# Patient Record
Sex: MALE | Race: WHITE
[De-identification: names, ages, dates, MRNs, and addresses within clinical notes are randomized per-mention and may not be internally consistent; named-entity substitution may affect disease eponyms.]

---

## 2021-02-19 ENCOUNTER — HOSPITAL ENCOUNTER (EMERGENCY)
Dept: HOSPITAL 33 - ED | Age: 46
Discharge: HOME | End: 2021-02-19
Payer: SELF-PAY

## 2021-02-19 VITALS — OXYGEN SATURATION: 97 % | HEART RATE: 64 BPM | DIASTOLIC BLOOD PRESSURE: 73 MMHG | SYSTOLIC BLOOD PRESSURE: 129 MMHG
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
## 2021-02-19 DIAGNOSIS — M25.521: Primary | ICD-10-CM

## 2021-02-19 PROCEDURE — 73080 X-RAY EXAM OF ELBOW: CPT

## 2021-02-19 PROCEDURE — 99283 EMERGENCY DEPT VISIT LOW MDM: CPT

## 2021-02-19 NOTE — XRAY
Indication: Pain.  No known injury.



Comparison: None



3 view right elbow demonstrates tiny olecranon process spur.  No other bony,

articular, or soft tissue abnormalities.

## 2021-02-19 NOTE — ERPHSYRPT
- History of Present Illness


Time Seen by Provider: 02/19/21 07:55


Source: patient


Exam Limitations: no limitations


Patient Subjective Stated Complaint: R elbow pain


Triage Nursing Assessment: pt to ED c/o R elbow pain x 1-1.5 weeks. reports he 

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

describes as throbbing pain that is 8/10. pain occasionally shoots through arm 

with certain movements. no swelling or redness noted on assessment. no loss of 

ROM.


Physician History: 





Location: right elbow


Quality: sharp


Radiation: down arm


Severity: moderate


Duration: 1-2 weeks


Timing: gradual 


Modifying factors/associated signs and symptoms: none tried


Allergies/Adverse Reactions: 








No Known Drug Allergies Allergy (Unverified 02/19/21 07:50)


   





Home Medications: 








No Reportable Medications [No Reported Medications]  02/19/21 [History]





Hx Tetanus, Diphtheria Vaccination/Date Given: No


Hx Influenza Vaccination/Date Given: No


Immunizations Up to Date: No





Travel Risk





- International Travel


Have you traveled outside of the country in past 3 weeks: No





- Coronavirus Screening


Are you exhibiting any of the following symptoms?: No


Close contact with a COVID-19 positive Pt in past 14-21 Days: No





- Review of Systems


Constitutional: No Fever, No Chills


Eyes: No Symptoms


Ears, Nose, & Throat: No Symptoms


Respiratory: No Cough, No Dyspnea


Cardiac: No Chest Pain, No Edema, No Syncope


Abdominal/Gastrointestinal: No Abdominal Pain, No Nausea, No Vomiting, No 

Diarrhea


Genitourinary Symptoms: No Dysuria


Musculoskeletal: Joint Pain, Other (right elbow pain), No Back Pain, No Neck 

Pain


Skin: Other, No Rash


Neurological: No Dizziness, No Focal Weakness, No Sensory Changes


Psychological: No Symptoms


Endocrine: No Symptoms


All Other Systems: Reviewed and Negative





- Past Medical History


Pertinent Past Medical History: No





- Past Surgical History


Past Surgical History: Yes


Musculoskeletal: Orthopedic Surgery


Male Surgical History: Vasectomy


Other Surgical History: L shoulder 2006





- Social History


Smoking Status: Light tobacco smoker


Exposure to second hand smoke: Yes


Drug Use: none


Patient Lives Alone: No





- Nursing Vital Signs


Nursing Vital Signs: 


                               Initial Vital Signs











Temperature  96.8 F   02/19/21 07:43


 


Pulse Rate  60   02/19/21 07:43


 


Respiratory Rate  18   02/19/21 07:43


 


Blood Pressure  166/81   02/19/21 07:43


 


O2 Sat by Pulse Oximetry  99   02/19/21 07:43








                                   Pain Scale











Pain Intensity                 8

















- Physical Exam


General Appearance: no apparent distress, alert


Eye Exam: PERRL/EOMI, eyes nml inspection


Ears, Nose, Throat Exam: normal ENT inspection, TMs normal, pharynx normal, 

moist mucous membranes


Neck Exam: normal inspection, non-tender, supple, full range of motion


Respiratory Exam: normal breath sounds, lungs clear, No respiratory distress


Cardiovascular Exam: regular rate/rhythm, normal heart sounds, normal peripheral

 pulses


Gastrointestinal/Abdomen Exam: soft, normal bowel sounds, No tenderness, No mass


Back Exam: normal inspection, normal range of motion, No CVA tenderness, No ve

rtebral tenderness


Extremity Exam: normal inspection, normal range of motion, pelvis stable, other 

(Right elbow pain. No obvious deformity, sensation intact, 2+ capillary refill, 

2 point tactile discrimination intact. 5 out of 5)


Neurologic Exam: alert, oriented x 3, cooperative, normal mood/affect, nml 

cerebellar function, nml station & gait, sensation nml, No motor deficits


Skin Exam: normal color, warm, dry, No rash


Lymphatic Exam: No adenopathy


SpO2: 99





- Course


Nursing assessment & vital signs reviewed: Yes


Ordered Tests: 


                               Active Orders 24 hr











 Category Date Time Status


 


 ELBOW (MINIMUM 3 VIEWS) Stat Exams  02/19/21 08:40 Completed














- Progress


Progress: improved


Progress Note: 





02/19/21 08:24


We will obtain an XR of right elbow. 


02/19/21 09:05


no fracture thing. Patient should follow up with ortho for continued monitoring,

 possible MRI.





Plan of care was discussed with patient and all questions answered. The patient 

is agreeable to be


discharged home and both verbal and printed discharge instructions were 

provided.The patient agreed


to seek outpatient follow up as discussed. The patient was given strict 

instructions to return to the


emergency department for worsening symptoms or any other emergent concerns. The 

patient


verbalized understanding. 


Counseled pt/family regarding: diagnosis, need for follow-up, rad results





- Departure


Departure Disposition: Home


Clinical Impression: 


 Right elbow pain





Condition: Stable


Critical Care Time: No


Instructions:  Elbow Sprain (DC)

## 2021-05-24 ENCOUNTER — HOSPITAL ENCOUNTER (EMERGENCY)
Dept: HOSPITAL 33 - ED | Age: 46
Discharge: HOME | End: 2021-05-24
Payer: COMMERCIAL

## 2021-05-24 VITALS — OXYGEN SATURATION: 100 % | SYSTOLIC BLOOD PRESSURE: 141 MMHG | DIASTOLIC BLOOD PRESSURE: 72 MMHG | HEART RATE: 55 BPM

## 2021-05-24 DIAGNOSIS — S33.9XXA: Primary | ICD-10-CM
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
## 2021-05-24 LAB
GLUCOSE UR-MCNC: NEGATIVE MG/DL
PROT UR STRIP-MCNC: NEGATIVE MG/DL
RBC #/AREA URNS HPF: (no result) /HPF (ref 0–2)
WBC #/AREA URNS HPF: (no result) /HPF (ref 0–5)

## 2021-05-24 PROCEDURE — 99284 EMERGENCY DEPT VISIT MOD MDM: CPT

## 2021-05-24 PROCEDURE — 96374 THER/PROPH/DIAG INJ IV PUSH: CPT

## 2021-05-24 PROCEDURE — 36000 PLACE NEEDLE IN VEIN: CPT

## 2021-05-24 PROCEDURE — 72100 X-RAY EXAM L-S SPINE 2/3 VWS: CPT

## 2021-05-24 PROCEDURE — 96375 TX/PRO/DX INJ NEW DRUG ADDON: CPT

## 2021-05-24 PROCEDURE — 81001 URINALYSIS AUTO W/SCOPE: CPT

## 2021-05-24 NOTE — XRAY
Indication: Low back pain following lifting injury.



Comparison: None



3 view lumbar spine demonstrates 5 lumbar segments in normal alignment with

minimal L5-S1 disc space narrowing and incidental splenic calcified

granulomas.  No other bony, articular, or soft tissue abnormalities.

## 2021-05-24 NOTE — ERPHSYRPT
- History of Present Illness


Time Seen by Provider: 05/24/21 08:50


Source: patient


Exam Limitations: no limitations


Patient Subjective Stated Complaint: Pt was moving furniture and twisted and 

felt something in his lower left back and now he is in pain


Triage Nursing Assessment: Pt drove self to the ER, vitals wnl, rates pain 8/10,

pain to lower left back, denies any other injuries, pulses normal


Physician History: 


Patient is a 45-year-old male presents to our ED for evaluation of pain to his 

left lower back.  Patient states he was moving furniture yesterday.  Patient 

twisted and felt a sudden onset of pain in his left lower back.  No radiation to

his legs.  Pain described as an ache that is well localized.  No associated 

chest pain.  No nausea vomiting or diaphoresis.  Pain worse with movement and 

palpation.  Pain improved with rest.  Patient voices no other complaints or 

concerns at this time.





Timing/Duration: yesterday


Method of Injury: lifting


Quality: dull


Back Pain Location: lumbar spine


Severity of Pain-Max: moderate


Severity of Pain-Current: mild


Modifying Factors: Improves With: movement


Associated Symptoms: denies symptoms, lower back pain, No fever, No sweating, No

urinary incontinence, No loss of bowel control, No nausea, No vomiting, No 

problems urinating, No light-headedness, No dizziness, No numbness in legs/feet,

No weakness, No tingling in legs/feet


Previous symptoms: no prior history


Allergies/Adverse Reactions: 








No Known Drug Allergies Allergy (Verified 05/24/21 08:46)


   





Hx Tetanus, Diphtheria Vaccination/Date Given: No


Hx Influenza Vaccination/Date Given: No





Travel Risk





- International Travel


Have you traveled outside of the country in past 3 weeks: No





- Coronavirus Screening


Are you exhibiting any of the following symptoms?: No


Close contact with a COVID-19 positive Pt in past 14-21 Days: No





- Vaccine Status


Have you recieved a Covid-19 vaccination: No





- Review of Systems


Constitutional: No Symptoms, No Fever, No Chills


Eyes: No Symptoms


Ears, Nose, & Throat: No Symptoms


Respiratory: No Symptoms, No Cough, No Dyspnea


Cardiac: No Symptoms, No Chest Pain, No Edema, No Syncope


Abdominal/Gastrointestinal: No Symptoms, No Abdominal Pain, No Nausea, No 

Vomiting, No Diarrhea


Genitourinary Symptoms: No Symptoms, No Dysuria


Musculoskeletal: No Symptoms, No Back Pain, No Neck Pain


Skin: No Symptoms, No Rash


Neurological: No Symptoms, No Dizziness, No Focal Weakness, No Sensory Changes


Psychological: No Symptoms


Endocrine: No Symptoms


Hematologic/Lymphatic: No Symptoms


Immunological/Allergic: No Symptoms


All Other Systems: Reviewed and Negative





- Past Medical History


Pertinent Past Medical History: No





- Past Surgical History


Past Surgical History: Yes


Musculoskeletal: Orthopedic Surgery


Male Surgical History: Vasectomy


Other Surgical History: L shoulder 2006





- Social History


Smoking Status: Light tobacco smoker


Exposure to second hand smoke: Yes


Drug Use: none


Patient Lives Alone: No





- Nursing Vital Signs


Nursing Vital Signs: 


                               Initial Vital Signs











Temperature  97.4 F   05/24/21 08:34


 


Pulse Rate  55 L  05/24/21 08:34


 


Blood Pressure  141/72   05/24/21 08:34


 


O2 Sat by Pulse Oximetry  100   05/24/21 08:34








                                   Pain Scale











Pain Intensity []              8


 


Pain Intensity                 8

















- Physical Exam


General Appearance: no apparent distress, alert


Eye Exam: PERRL/EOMI, eyes nml inspection


Neck Exam: normal inspection, non-tender, supple, full range of motion, No 

meningismus, No midline tenderness


Respiratory Exam: normal breath sounds, lungs clear, No respiratory distress


Cardiovascular Exam: regular rate/rhythm, normal heart sounds


Gastrointestinal Exam: soft, No tenderness, No mass, No pulsatile mass


Back Exam: normal inspection (guarded ROM.  TTP at left lumbar paraspinal 

musculature.  Overlying ST intact. ), normal range of motion, No vertebral 

tenderness


Extremity Exam: normal inspection, normal range of motion, No calf tenderness, 

No pedal edema


Neurologic Exam: alert, oriented x 3, cooperative, CNs II-XII nml as tested, 

normal mood/affect, nml station & gait, sensation nml, No motor deficits


Skin Exam: normal color, warm, dry, No rash


Lymphatic Exam: No adenopathy


**SpO2 Interpretation**: normal


SpO2: 100


O2 Delivery: Room Air





- Course


Nursing assessment & vital signs reviewed: Yes





- Radiology Exams


  ** L-Spine


X-ray Interpretation: Teleradiologist Report (5 lumbar segments in normal 

alignment with minimal L5-S1 disc space narrowing and incidental splenic 

calcified granulomas.  No other bony articular or soft tissue abnormalities.)


Ordered Tests: 


                               Active Orders 24 hr











 Category Date Time Status


 


 IV Insertion STAT Care  05/24/21 08:41 Active


 


 LUMBAR LIMITED (2 OR 3 VIEWS) Stat Exams  05/24/21 08:45 Completed


 


 UA W/RFX UR CULTURE Stat Lab  05/24/21 09:06 Ordered








Medication Summary














Discontinued Medications














Generic Name Dose Route Start Last Admin





  Trade Name Samantha  PRN Reason Stop Dose Admin


 


Ketorolac Tromethamine  30 mg  05/24/21 08:41  05/24/21 09:09





  Toradol 30 Mg Injection***  IV  05/24/21 08:42  30 mg





  STAT ONE   Administration


 


Ketorolac Tromethamine  Confirm  05/24/21 09:04 





  Toradol 30 Mg Injection***  Administered  05/24/21 09:05 





  Dose  





  30 mg  





  .ROUTE  





  .STK-MED ONE  


 


Methylprednisolone Sodium Succinate  125 mg  05/24/21 08:43  05/24/21 09:09





  Solu-Medrol 125 Mg***  IV  05/24/21 08:44  125 mg





  STAT ONE   Administration


 


Methylprednisolone Sodium Succinate  Confirm  05/24/21 09:04 





  Solu-Medrol 125 Mg***  Administered  05/24/21 09:05 





  Dose  





  125 mg  





  .ROUTE  





  .STK-MED ONE  











Lab/Rad Data: 


                               Laboratory Results











  05/24/21 Range/Units





  09:06 


 


Urine Color  YELLOW  (YELLOW)  


 


Urine Appearance  CLEAR  (CLEAR)  


 


Urine pH  7.0  (5-6)  


 


Ur Specific Gravity  1.019  (1.005-1.025)  


 


Urine Protein  NEGATIVE  (Negative)  


 


Urine Ketones  NEGATIVE  (NEGATIVE)  


 


Urine Blood  NEGATIVE  (0-5)  Leighton/ul


 


Urine Nitrite  NEGATIVE  (NEGATIVE)  


 


Urine Bilirubin  NEGATIVE  (NEGATIVE)  


 


Urine Urobilinogen  NEGATIVE  (0-1)  mg/dL


 


Ur Leukocyte Esterase  NEGATIVE  (NEGATIVE)  


 


Urine WBC (Auto)  NONE  (0-5)  /HPF


 


Urine RBC (Auto)  NONE  (0-2)  /HPF


 


U Epithel Cells (Auto)  NONE  (FEW)  /HPF


 


Urine Bacteria (Auto)  NONE  (NEGATIVE)  /HPF


 


Urine Mucus (Auto)  SLIGHT  (NEGATIVE)  /HPF


 


Urine Culture Reflexed  NO  (NO)  


 


Urine Glucose  NEGATIVE  (NEGATIVE)  mg/dL














- Progress


Progress: improved


Progress Note: 


Patient reassessed.  Pain improved.  Vitals stable.  X-ray negative for fracture

 dislocation.  A prescription for Toradol was forwarded the patient's pharmacy. 

 Patient agrees to follow-up with his primary care doctor within 48 hours for 

reevaluation.


05/24/21 09:19





Portions of this note were created with voice recognition technology.  There may

 be grammatical, spelling, punctuation or sound alike errors


05/24/21 09:20





Counseled pt/family regarding: diagnosis, need for follow-up, rad results





- Departure


Departure Disposition: Home


Clinical Impression: 


 Lumbosacral strain





Condition: Stable


Critical Care Time: No


Referrals: 


DOCTOR,NO FAMILY [Primary Care Provider] - 


DARYL DE LA FUENTE MD [ACTIVE STAFF] - 


Instructions:  Low Back Pain  (DC)


Additional Instructions: 


Discharge/Care Plan





MELY MCKNIGHT was seen on 05/24/21 in the Emergency Room. The patient was 

counseled regarding Diagnosis,Lab results, Imaging studies, need for follow up 

and when to return to the Emergency Room.





Prescriptions given:





Discharge Note





I have spoken with the patient and/or caregivers. I have explained the patient's

condition, diagnosis and treatment plan based on the information available to me

at this time. I have answered the patient's and/or caregiver's questions and 

addressed any concerns. The patient and/or caregivers have as good understanding

of the patient's diagnosis, condition and treatment plan as can be expected at 

this point. The vital signs have been stable. The patient's condition is stable 

and appropriate for discharge from the emergency department.





The patient will pursue further outpatient evaluation with the primary care 

physician or other designated or consulting physician as outlined in the 

discharge instructions. The patient and/or caregivers are agreeable to this plan

of care and follow-up instructions have been explained in detail. The patient 

and/or caregivers have received these instruction. The patient/and or caregivers

are aware that any significant change in condition or worsening of symptoms 

should prompt an immediate return to this or the closest emergency department or

call 911. 








Forms:  Work/School Release Form


Prescriptions: 


Ketorolac Tromethamine [Toradol] 10 mg PO TID 5 Days #15 tablet

## 2021-06-02 ENCOUNTER — HOSPITAL ENCOUNTER (EMERGENCY)
Dept: HOSPITAL 33 - ED | Age: 46
Discharge: HOME | End: 2021-06-02
Payer: COMMERCIAL

## 2021-06-02 VITALS — SYSTOLIC BLOOD PRESSURE: 115 MMHG | DIASTOLIC BLOOD PRESSURE: 78 MMHG | OXYGEN SATURATION: 95 %

## 2021-06-02 VITALS — HEART RATE: 53 BPM

## 2021-06-02 DIAGNOSIS — M51.06: ICD-10-CM

## 2021-06-02 DIAGNOSIS — M54.5: Primary | ICD-10-CM
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
## 2021-06-02 PROCEDURE — 96372 THER/PROPH/DIAG INJ SC/IM: CPT

## 2021-06-02 PROCEDURE — 72131 CT LUMBAR SPINE W/O DYE: CPT

## 2021-06-02 PROCEDURE — 99284 EMERGENCY DEPT VISIT MOD MDM: CPT

## 2021-06-02 NOTE — XRAY
Indication: Low back pain radiating left leg.  Lifting injury one week ago.



Multiple contiguous axial images obtained through the lumbar spine.  Sagittal

and coronal reformatted images obtained.



Comparison: None



T12-L4 disc levels unremarkable.  At the L4-L5 level, there is mild

broad-based left lateral disc bulge producing left foraminal stenosis better

evaluated with MRI.  At the L5-S1 level, there is minimal broad-based central

disc bulge.  Facets are symmetric.  Incidental 5 mm left S2 bone island.



Sagittal and coronal reformatted images demonstrates normal alignment with

disc spaces maintained.  Minimal T12-L1 anterior wedging, probable

transitional segments.  No acute fracture or subluxation.



Visualized noncontrasted soft tissues demonstrates minimal aortoiliac

calcifications.



Impression:

1.  L4-S1 broad-based disc bulge as detailed.  Outpatient MRI may yield

further information.

2.  Incidental tiny left S2 bone island.

## 2021-06-02 NOTE — ERPHSYRPT
- History of Present Illness


Source: patient


Exam Limitations: no limitations


Patient Subjective Stated Complaint: pt states he has been having increased back

pain since last night. states pain is in th emiddle of his lower back and 

radiates to front of lt leg at times. denies any bowel or bladder incontinence.


Triage Nursing Assessment: pt alert and oriented, answers questions approp. pt 

ambulatory with slow limping gait noted. skin warm and dry. respirations 

nonlabored. pedal pulse and cap refill to bilat lower ext wnl. pt denies 

numbness or tingling.


Timing/Duration: week(s) (1), sudden, worse


Method of Injury: lifting, twisted


Quality: sharp, throbbing


Back Pain Location: lumbar spine, paraspinous muscles


Back Pain Radiation: upper legs


Severity of Pain-Max: severe


Severity of Pain-Current: severe


Modifying Factors: Improves With: immobilization, pain medication, rest.  

Worsens With: movement


Associated Symptoms: lower back pain, muscle spasms, No fever, No chills, No 

urinary incontinence, No loss of bowel control, No constipation, No nausea, No 

vomiting, No problems urinating, No numbness in legs/feet, No weakness, No 

sensory/motor loss, No tingling in legs/feet


Previous symptoms: no prior history


Hx Tetanus, Diphtheria Vaccination/Date Given: No


Hx Influenza Vaccination/Date Given: No


Hx Pneumococcal Vaccination/Date Given: No


Immunizations Up to Date: No





<ESPINOZA REGAN - Last Filed: 06/02/21 06:45>





<CHRISTIANO MCWILLIAMS - Last Filed: 06/02/21 09:01>





- History of Present Illness


Time Seen by Provider: 06/02/21 06:34


Physician History: 





45 years old male presented in the ER with chief complaint of low back pain 

which started almost a week ago after lifting heavy object, was evaluated in the

ER with a negative x-rays with off work until yesterday.  Patient report he went

back to work yesterday and was unable to walk at the end of the day with 

worsening overnight and this morning could move himself out of bed with severe 

sharp pain low back midline and to the left with radiation.  Denies any asso

ciated numbness tingling or weakness of left lower extremity.  No loss of bowel 

or bladder control. (ESPINOZA REGAN)


Allergies/Adverse Reactions: 








No Known Drug Allergies Allergy (Verified 06/02/21 06:43)


   








Travel Risk





- International Travel


Have you traveled outside of the country in past 3 weeks: No





- Coronavirus Screening


Are you exhibiting any of the following symptoms?: No


Close contact with a COVID-19 positive Pt in past 14-21 Days: No





- Vaccine Status


Have you recieved a Covid-19 vaccination: No





<ESPINOZA REGAN - Last Filed: 06/02/21 06:45>





- Review of Systems


Constitutional: No Symptoms


Eyes: No Symptoms


Ears, Nose, & Throat: No Symptoms


Respiratory: No Symptoms


Cardiac: No Symptoms


Abdominal/Gastrointestinal: No Symptoms


Genitourinary Symptoms: No Symptoms


Musculoskeletal: Back Pain


Skin: No Symptoms


Neurological: No Symptoms


Psychological: No Symptoms


Endocrine: No Symptoms


Hematologic/Lymphatic: No Symptoms


Immunological/Allergic: No Symptoms





<ESPINOZA REGAN - Last Filed: 06/02/21 06:45>





- Past Medical History


Pertinent Past Medical History: No





- Past Surgical History


Past Surgical History: Yes


Musculoskeletal: Orthopedic Surgery


Male Surgical History: Vasectomy


Other Surgical History: L shoulder 2006





- Social History


Smoking Status: Light tobacco smoker


Exposure to second hand smoke: No


Drug Use: none


Patient Lives Alone: No





<ESPINOZA REGAN - Last Filed: 06/02/21 06:45>





- Physical Exam


General Appearance: no apparent distress, alert


Eye Exam: PERRL/EOMI, eyes nml inspection


Ears, Nose, Throat Exam: normal ENT inspection


Neck Exam: normal inspection, full range of motion


Respiratory Exam: normal breath sounds, lungs clear


Cardiovascular Exam: regular rate/rhythm, normal heart sounds


Gastrointestinal Exam: soft, normal bowel sounds, No tenderness


Back Exam: normal inspection, vertebral tenderness (Lumbar), decreased range of 

motion, muscle spasm, point tenderness, No normal range of motion


Extremity Exam: normal inspection, pelvis stable, limited range of motion (Left 

lower extremity because of pain in the low back)


Neurologic Exam: alert, oriented x 3, cooperative, CNs II-XII nml as tested, 

normal mood/affect, abnormal gait, other (2+ knee reflex and Achilles and left 

lower.  Intact sensation.), No sensory deficit


Skin Exam: normal color


**SpO2 Interpretation**: normal


SpO2: 99


O2 Delivery: Room Air





<ESPINOZA REGAN - Last Filed: 06/02/21 06:45>





- Nursing Vital Signs


Nursing Vital Signs: 


                               Initial Vital Signs











Temperature  98.5 F   06/02/21 06:22


 


Pulse Rate  59 L  06/02/21 06:22


 


Respiratory Rate  18   06/02/21 06:22


 


Blood Pressure  132/83   06/02/21 06:22


 


O2 Sat by Pulse Oximetry  99   06/02/21 06:22








                                   Pain Scale











Pain Intensity                 4














Ordered Tests: 


                               Active Orders 24 hr











 Category Date Time Status


 


 LUMBAR SPINE W/O [CT] Stat Exams  06/02/21 06:44 Completed








Medication Summary














Discontinued Medications














Generic Name Dose Route Start Last Admin





  Trade Name Samantha  PRN Reason Stop Dose Admin


 


Hydromorphone HCl  1 mg  06/02/21 06:43  06/02/21 06:47





  Hydromorphone 1 Mg/Ml Injection***  IM  06/02/21 06:44  1 mg





  STAT ONE   Administration


 


Hydromorphone HCl  Confirm  06/02/21 06:44 





  Hydromorphone 1 Mg/Ml Injection***  Administered  06/02/21 06:45 





  Dose  





  1 mg  





  .ROUTE  





  .STK-MED ONE  


 


Orphenadrine Citrate  60 mg  06/02/21 06:43  06/02/21 06:47





  Norflex 60 Mg/2 Ml***  IM  06/02/21 06:44  60 mg





  STAT ONE   Administration


 


Orphenadrine Citrate  Confirm  06/02/21 06:44 





  Norflex 60 Mg/2 Ml***  Administered  06/02/21 06:45 





  Dose  





  60 mg  





  .ROUTE  





  .STK-MED ONE  














<ESPINOZA REGAN - Last Filed: 06/02/21 06:45>





- Progress


Progress: improved, pain not gone completely


Counseled pt/family regarding: diagnosis, need for follow-up, rad results





<CHRISTIANO MCWILLIAMS - Last Filed: 06/02/21 09:01>





- Progress


Progress Note: 





06/02/21 06:53


45 years old is evaluated for acute low back pain after lifting heavy object a 

week ago with worsening since yesterday.  Given symptomatic treatment with 

Dilaudid and Norflex.  Will obtain imaging with CT and if it showed any spinal 

compromise, will plan on getting MRI.  Care is transferred to Dr. Mcwilliams at 

shift change. (ESPINOZA REGAN)





06/02/21 07:31


Reviewed the patient's current chart and old chart from his emergency department

visit here on 5/24/2021.  No acute findings on his lumbar plain x-ray on that 

date.  Awaiting the CAT scan of the lumbar spine report.  We will provide the 

patient outpatient symptomatic treatment.  Patient evaluated upon his return 

from the CAT scan.  Pain is improved but not completely gone.  He has no bladder

or bowel incontinence.  He will require outpatient evaluation from a primary 

care physician and possibly from orthopedic specialty.


06/02/21 09:00


CAT scan of the lumbar spine without contrast shows a mild L4-S1 disc bulge.  

Radiologist recommends outpatient MRI for further evaluation. (CHRISTIANO MCWILLIAMS)





<ESPINOZA REGAN - Last Filed: 06/02/21 06:45>





- Departure


Departure Disposition: Home


Critical Care Time: No





<CHRISTIANO MCWILLIAMS - Last Filed: 06/02/21 09:01>





- Departure


Clinical Impression: 


 Recurrent low back pain, Bulge of lumbar disc without myelopathy





Condition: Stable


Referrals: 


DOCTOR,NO FAMILY [Primary Care Provider] - 


Additional Instructions: 


Use back exercises as discussed.  Follow-up with primary care physician for 

further management of your chronic recurrent back pain including outpatient MRI 

of lumbar spine.  Take medication as prescribed.


Forms:  Work/School Release Form


Prescriptions: 


Carisoprodol 350 mg** [Soma 350 mg**] 350 mg PO Q8H PRN PRN #10 tablet


 PRN Reason: Muscle Spasms


Prednisone 10 mg*** [Deltasone 10 mg***] 10 mg PO TID #12 tablet